# Patient Record
Sex: FEMALE | URBAN - NONMETROPOLITAN AREA
[De-identification: names, ages, dates, MRNs, and addresses within clinical notes are randomized per-mention and may not be internally consistent; named-entity substitution may affect disease eponyms.]

---

## 2020-06-01 ENCOUNTER — IMPORTED ENCOUNTER (OUTPATIENT)
Dept: URBAN - NONMETROPOLITAN AREA CLINIC 1 | Facility: CLINIC | Age: 30
End: 2020-06-01

## 2020-06-01 PROCEDURE — 67800 REMOVE EYELID LESION: CPT

## 2020-06-01 NOTE — PATIENT DISCUSSION
Chalazion RUL- discussed diagnosis with patient- patient opted to have chalazion removed- pt instructed to use warm cloth for comfort- RTC prn

## 2020-06-02 PROBLEM — H00.11: Noted: 2020-06-02

## 2022-04-15 ASSESSMENT — VISUAL ACUITY
OS_SC: 20/20
OD_SC: 20/20

## 2022-04-15 ASSESSMENT — TONOMETRY
OS_IOP_MMHG: 12
OD_IOP_MMHG: 12

## 2024-03-25 ENCOUNTER — NEW PATIENT (OUTPATIENT)
Dept: RURAL CLINIC 1 | Facility: CLINIC | Age: 34
End: 2024-03-25

## 2024-03-25 DIAGNOSIS — H00.11: ICD-10-CM

## 2024-03-25 PROCEDURE — 92002 INTRM OPH EXAM NEW PATIENT: CPT

## 2024-03-25 PROCEDURE — 92285 EXTERNAL OCULAR PHOTOGRAPHY: CPT

## 2024-03-25 ASSESSMENT — VISUAL ACUITY
OD_SC: 20/20
OS_SC: 20/20

## 2024-03-25 ASSESSMENT — TONOMETRY
OD_IOP_MMHG: 17
OS_IOP_MMHG: 17

## 2024-04-15 ENCOUNTER — CLINIC PROCEDURE ONLY (OUTPATIENT)
Dept: RURAL CLINIC 1 | Facility: CLINIC | Age: 34
End: 2024-04-15

## 2024-04-15 DIAGNOSIS — H00.14: ICD-10-CM

## 2024-04-15 DIAGNOSIS — H00.11: ICD-10-CM

## 2024-04-15 PROCEDURE — 67800 REMOVE EYELID LESION: CPT

## 2024-04-15 PROCEDURE — 99213 OFFICE O/P EST LOW 20 MIN: CPT

## 2024-04-15 RX ORDER — OLOPATADINE HYDROCHLORIDE 2 MG/ML: 1 SOLUTION OPHTHALMIC

## 2024-04-15 ASSESSMENT — VISUAL ACUITY
OS_SC: 20/20
OD_SC: 20/20